# Patient Record
Sex: MALE | Race: WHITE | NOT HISPANIC OR LATINO | Employment: OTHER | URBAN - METROPOLITAN AREA
[De-identification: names, ages, dates, MRNs, and addresses within clinical notes are randomized per-mention and may not be internally consistent; named-entity substitution may affect disease eponyms.]

---

## 2017-12-20 ENCOUNTER — ALLSCRIPTS OFFICE VISIT (OUTPATIENT)
Dept: OTHER | Facility: OTHER | Age: 82
End: 2017-12-20

## 2017-12-21 NOTE — CONSULTS
Assessment   1  Diabetes mellitus (250 00) (E11 9)    Plan   Diabetes mellitus    · From  Tresiba FlexTouch 100 UNIT/ML Subcutaneous Solution Pen-injector 17    units in the am    8 units in the pm To Tresiba FlexTouch 100 UNIT/ML Subcutaneous Solution    Pen-injector 20 units in the evening   Rx By: Siena Seay; Dispense: 0 Days ; #:1 X 3 ML Pen (5 Pens); Refill: 5;For: Diabetes mellitus; STEVENSON = N; Record   · *1 - [facility] SELF MANAGEMENT TRAINING OUTPATIENT Co-Management  ehsan    cgm  Status: Hold For - Scheduling  Requested for: [id number]   Ordered; For: Diabetes mellitus; Ordered By: Siena Seay Performed:  Due: 09QUI0450  requires instruction : No  Needs requiring Individual DSMT? : No  Self-Management Education/Trainng : Individual Education  Care Summary provided  : Yes   · Follow-up visit in 1 month Evaluation and Treatment  Follow-up  Status: Complete  Done:    [id number]   Ordered; For: Diabetes mellitus; Ordered By: Siena Seay Performed:  Due: 04NHD7004; Last Updated By: Royal Hollis; 12/20/2017 12:16:41 PM    Discussion/Summary   Discussion Summary:    Type 2 diabetes with multiple complications uncontrolled based on history of hypoglycemia: The patient's most recent A1c of 6 0 in the setting of hypoglycemia unawareness, history of macrovascular disease and other comorbidities is below goal  I would be more comfortable with his A1c between 7 and 8 to minimize hypoglycemia and its negative affects  Reviewing the patient's blood glucose logs reveals blood sugars frequently at goal, but occasionally has hypoglycemia or sugars close to hypoglycemia before breakfast  I have suggested we switch Tresiba to once daily 20 units at bedtime  He will continue his current NovoLog scale of 2 units for every 50/150  In the setting of hypoglycemia unawareness, CKD, insulin use, I suggested we discontinue glimepiride  He will provide logs in about 2 weeks for review   We discussed use of continuous glucose monitoring devices  He is interested in using a diagnostic ehsan sensor right now for more information in terms of insulin monitoring and dose adjustments  In the future, he may be interested in personal use DEXCOM or freestyle ehsan, but we will Re approach this idea at future visits  I feel he would benefit from personal CGM in setting of hypoglycemia unawareness  Follow-up in 1 month  He will follow-up with his other providers as scheduled  Counseling Documentation With Imm: The patient, patient's family was counseled regarding diagnostic results,-- instructions for management,-- impressions,-- importance of compliance with treatment  Medication SE Review and Pt Understands Tx: Possible side effects of new medications were reviewed with the patient/guardian today  The treatment plan was reviewed with the patient/guardian  The patient/guardian understands and agrees with the treatment plan      Chief Complaint   Chief Complaint Free Text Note Form: consult      History of Present Illness   HPI: 75-year-old male with history of type 2 diabetes for over 10 years complicated by 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 3 days a week, CAD status post stent, hypoglycemia unawareness presents to establish care for his diabetes  He reports initially was treated with p o  medications and about for 5 years ago was transitioned to insulin  Currently he takes Jacob Bowl 17 units in the morning and 8 units at bedtime  He takes NovoLog on a sliding scale of 2 units for every 50 above 150  He also takes glimepiride 4 mg twice a day  He does report history of hypoglycemia particularly in the morning causing him to decrease his evening Tresiba dose on his own down to 8 units  He follows closely with an eye dr, podiatrist, nephrologist, cardiologist, and dietitian  Overall he is feeling well and denies specific concerns today        Review of Systems   Endo Adult ROS Male New Patient:      Constitutional/General: change in ring size,-- no change in shoe size,-- no chills,-- no dizziness,-- no fainting,-- no fatigue,-- no fever,-- no forgetfulness,-- no headache,-- loss of sleep,-- weight loss,-- no nervousness,-- no numbness,-- temperature intolerance,-- no excessive sweating-- and-- no weight gain  Muscle/Joint/Bone: no arm pain,-- no back pain,-- no hip pain,-- no leg pain,-- no foot pain,-- no neck pain,-- no hand pain,-- no shoulder pain,-- no arm weakness,-- no back weakness,-- no hip weakness,-- no leg weakness,-- no foot weakness,-- no neck weakness,-- no hand weakness,-- no shoulder weakness,-- no arm numbness,-- no back numbness,-- no hip numbness,-- no leg numbness,-- no foot numbness,-- no neck numbness,-- no hand numbness-- and-- no shoulder numbness  Gastrointestinal: constipation-- and-- diarrhea, but-- no excessive hunger,-- no excessive thirst,-- no nausea,-- no poor appetite,-- no rectal bleeding,-- no stomach pain,-- no vomiting-- and-- no vomiting blood  Cardiovascular: hypertension-- and-- poor circulation, but-- no chest pain,-- no irregular heart beat,-- no hypotension,-- no rapid heart beat-- and-- no ankle swelling  Eye/Ear/Nose/Throat: persistent cough-- and-- sinus problems, but-- no bleeding gums,-- no blurred vision,-- no difficulty swallowing,-- no double vision,-- no gritty eyes,-- no hoarseness,-- no hearing loss,-- not seeing flashes-- and-- not seeing halos  Skin: bruising easily-- and-- itching, but-- no hives,-- no change in a mole,-- no rashes,-- no scar-- and-- no slow healing sores  Genitourinary no blood in urine,-- no frequent urination,-- no night time urination-- and-- no painful urination  Genitourinary - Reproductive breast lump, but-- no erection difficulties  ROS Reviewed:    ROS reviewed  Past Medical History   Active Problems And Past Medical History Reviewed: The active problems and past medical history were reviewed and updated today        Surgical History   Surgical History Reviewed: The surgical history was reviewed and updated today  Family History   Family History Reviewed: The family history was reviewed and updated today  Social History   Social History Reviewed: The social history was reviewed and updated today  Current Meds    1  Adult Aspirin Low Strength 81 MG TBDP; Therapy: (Recorded:02Qlo6283) to Recorded   2  Albuterol Sulfate (2 5 MG/3ML) 0 083% Inhalation Nebulization Solution; Therapy: (Recorded:89Ruc5692) to Recorded   3  Atorvastatin Calcium 40 MG Oral Tablet; Therapy: (Recorded:71Icm1367) to Recorded   4  Calcium Acetate 667 MG TABS; Therapy: (Recorded:03Suy7774) to Recorded   5  CarBAMazepine 200 MG Oral Tablet; Therapy: (Recorded:16Vad9459) to Recorded   6  Carvedilol 12 5 MG Oral Tablet; Therapy: (Recorded:14Ixz4729) to Recorded   7  Clopidogrel Bisulfate 75 MG Oral Tablet; Therapy: (Recorded:60Siz8412) to Recorded   8  Delsym 30 MG/5ML LQCR; Therapy: (Recorded:57Owt0766) to Recorded   9  Dulera 100-5 MCG/ACT Inhalation Aerosol; Therapy: (Recorded:51Lqi6458) to Recorded   10  Flonase Allergy Relief 50 MCG/ACT Nasal Suspension; Therapy: (Recorded:49Pmx9232) to Recorded   11  Folic Acid 1 MG Oral Tablet; Therapy: (Recorded:83Hoo5488) to Recorded   12  Isosorbide Dinitrate 30 MG Oral Tablet; Therapy: (Recorded:30Fjj4384) to Recorded   13  Levothyroxine Sodium 75 MCG Oral Tablet; take half a tablet daily; Therapy: (Recorded:00Fbc2080) to Recorded   14  Lidocaine 5 % External Ointment; Therapy: (Recorded:51Fkc2117) to Recorded   15  Lomotil 2 5-0 025 MG Oral Tablet; Therapy: (Recorded:21Xdo9881) to Recorded   16  Lumigan SOLN;      Therapy: (Recorded:65Kbm4346) to Recorded   17  Melatonin 10 MG Oral Tablet; Therapy: (Recorded:82Zlr8182) to Recorded   18  Montelukast Sodium 10 MG Oral Tablet; Therapy: (Recorded:71Dxk3032) to Recorded   19   Jadine Grew FlexPen 100 UNIT/ML Subcutaneous Solution Pen-injector; Per sliding scale      150-199 2 units, 200-249 3 units, 250-300 4 units, with meals; Therapy: (Recorded:26Hoh8143) to Recorded   20  ProAir HFA AERS; Therapy: (Recorded:62Fep5013) to Recorded   21  Sertraline HCl - 25 MG Oral Tablet; Therapy: (Recorded:77Aii7709) to Recorded   22  Ramin Lutzs FlexTouch 100 UNIT/ML Subcutaneous Solution Pen-injector; 17 units in the am      8 units in the pm;      Therapy: (Recorded:03Xbm6164) to Recorded  Medication List Reviewed: The medication list was reviewed and updated today  Allergies   1  Iodinated Contrast Media   2  Lisinopril TABS    Vitals   Vital Signs    Recorded: 20Dec2017 11:09AM   Heart Rate 68   Systolic 322   Diastolic 58   Height 5 ft 8 5 in   Weight 184 lb 6 oz   BMI Calculated 27 63   BSA Calculated 1 98     Physical Exam        Constitutional      General appearance: No acute distress, well appearing and well nourished  Eyes      Conjunctiva and lids: No swelling, erythema, or discharge  Pupils: Equal, round and reactive to light  The sclera are anicteric  Extraocular movements are intact  Ears, Nose, Mouth, and Throat      Neck: The neck is supple  The thyroid is normal in size with no palpable nodules  Pulmonary      Respiratory effort: No increased work of breathing or signs of respiratory distress  Auscultation of lungs: Clear to auscultation bilaterally with normal chest expansion  Cardiovascular      Auscultation of heart: Normal rate and rhythm with no murmurs, gallops or rubs  Examination of extremities for edema and/or varicosities: Normal        Abdomen      Abdomen: Abdomen is soft, non-tender with normal bowel sounds  Lymphatic      Palpation of lymph nodes: No supraclavicular or suboccipital lymphadenopathy  Skin      Skin and subcutaneous tissue: Normal skin temperature and color         Psychiatric      Orientation to person, place and time: Normal        Mood and affect: Affect and attention span are normal        Results/Data   Office Record Review: I have reviewed laboratory results as follows: 10/20/17: A1C 6 0        Signatures    Electronically signed by : RHETT Bynum ; Dec 20 2017 12:18PM EST                       (Author)

## 2018-01-08 ENCOUNTER — ALLSCRIPTS OFFICE VISIT (OUTPATIENT)
Dept: OTHER | Facility: OTHER | Age: 83
End: 2018-01-08

## 2018-01-22 VITALS
BODY MASS INDEX: 27.31 KG/M2 | DIASTOLIC BLOOD PRESSURE: 58 MMHG | HEART RATE: 68 BPM | SYSTOLIC BLOOD PRESSURE: 162 MMHG | HEIGHT: 69 IN | WEIGHT: 184.38 LBS

## 2018-01-23 NOTE — PROGRESS NOTES
Chief Complaint  Patient presented for application of CGM-Jose D  Patient acknowledged and signed treatment waiver  CGM was applied to the patient's upper right arm  Active Problems    1  Diabetes mellitus (250 00) (E11 9)    Current Meds   1  Adult Aspirin Low Strength 81 MG TBDP; Therapy: (Recorded:14Ttq2670) to Recorded   2  Albuterol Sulfate (2 5 MG/3ML) 0 083% Inhalation Nebulization Solution; Therapy: (Recorded:54Mzr5915) to Recorded   3  Amiodarone HCl - 200 MG Oral Tablet; Therapy: (Queta Mitchell) to Recorded   4  Atorvastatin Calcium 40 MG Oral Tablet; Therapy: (Recorded:97Upv8589) to Recorded   5  Calcium Acetate 667 MG TABS; Therapy: (Recorded:96Luo3264) to Recorded   6  CarBAMazepine 200 MG Oral Tablet; TAKE 1 TABLET TWICE DAILY; Therapy: (Recorded:96Eup2804) to Recorded   7  Carvedilol 12 5 MG Oral Tablet; TAKE 1 TABLET TWICE DAILY; Therapy: (Recorded:73Nwl6290) to Recorded   8  Clopidogrel Bisulfate 75 MG Oral Tablet; Therapy: (Recorded:89Qzi9336) to Recorded   9  Delsym 30 MG/5ML LQCR; Therapy: (Recorded:36Cxp6414) to Recorded   10  Dulera 100-5 MCG/ACT Inhalation Aerosol; Therapy: (Recorded:89Xtm3950) to Recorded   11  Flonase Allergy Relief 50 MCG/ACT Nasal Suspension; Therapy: (Recorded:83Pzk1327) to Recorded   12  Folic Acid 1 MG Oral Tablet; Therapy: (Recorded:34Drh9251) to Recorded   13  Isosorbide Dinitrate 30 MG Oral Tablet; Therapy: (Recorded:43Hxk9743) to Recorded   14  Levothyroxine Sodium 75 MCG Oral Tablet; take half a tablet daily; Therapy: (Recorded:12Xfz6041) to Recorded   15  Lidocaine 5 % External Ointment; Therapy: (Recorded:83Wie8853) to Recorded   16  Lomotil 2 5-0 025 MG Oral Tablet; Therapy: (Recorded:13Qtp8060) to Recorded   17  Lumigan SOLN;    Therapy: (Recorded:35Iag7226) to Recorded   18  Melatonin 10 MG Oral Tablet; Therapy: (Recorded:10Azr2744) to Recorded   19  Montelukast Sodium 10 MG Oral Tablet;     Therapy: (Recorded:47Hvp6128) to Recorded   20  NovoLOG FlexPen 100 UNIT/ML Subcutaneous Solution Pen-injector; Per sliding scale    150-199 2 units, 200-249 3 units, 250-300 4 units, with meals; Therapy: (Recorded:30Rjw4650) to Recorded   21  ProAir HFA AERS; Therapy: (Recorded:01Ise7099) to Recorded   22  Sertraline HCl - 25 MG Oral Tablet; Therapy: (Recorded:48Jvf2870) to Recorded   23  Bri Peer FlexTouch 100 UNIT/ML Subcutaneous Solution Pen-injector; 20 units in the    evening; Last Rx:72Jhl2421 Ordered    Allergies    1  Iodinated Contrast Media   2  Lisinopril TABS    Future Appointments    Date/Time Provider Specialty Site   01/24/2018 12:50 PM RHETT Bhandari   Endocrinology [facility] ENDOCRINOLOGY  [facility]   Electronically signed by : Zoran Carlisle, ; Jan 8 2018  2:36PM EST                       (Author)

## 2018-02-02 ENCOUNTER — TELEPHONE (OUTPATIENT)
Dept: ENDOCRINOLOGY | Facility: HOSPITAL | Age: 83
End: 2018-02-02

## 2018-02-02 NOTE — TELEPHONE ENCOUNTER
Patient's daughter called and spoke to Ladi up at [facility]  He cannot come in today to get the [place] removed  Ladi let them know that if the hospital removes the [place] or if it falls off to return it to the office   If it's still on when he is dc'd they will reschedule for removal

## 2018-02-08 DIAGNOSIS — Z79.4 TYPE 2 DIABETES MELLITUS WITH COMPLICATION, WITH LONG-TERM CURRENT USE OF INSULIN (HCC): Primary | ICD-10-CM

## 2018-02-08 DIAGNOSIS — E11.8 TYPE 2 DIABETES MELLITUS WITH COMPLICATION, WITH LONG-TERM CURRENT USE OF INSULIN (HCC): Primary | ICD-10-CM

## 2018-03-05 ENCOUNTER — TELEPHONE (OUTPATIENT)
Dept: OTHER | Facility: HOSPITAL | Age: 83
End: 2018-03-05

## 2018-03-06 NOTE — TELEPHONE ENCOUNTER
Received page from patient that he took 25 units of fast acting insulin at bedtime instead of Tresiba  His sugar was over 400 too  I suggested he not take any additional insulin tonight and monitor his sugar again after 1 and 2 hours from now  Suggested he have a snack or juice as well and also have additional 15-30 g of carb if his sugar is under 150 in about 2-3 hours before he goes to bed

## 2018-03-22 RX ORDER — SERTRALINE HYDROCHLORIDE 25 MG/1
TABLET, FILM COATED ORAL
COMMUNITY

## 2018-03-22 RX ORDER — FOLIC ACID 1 MG/1
TABLET ORAL
COMMUNITY

## 2018-03-22 RX ORDER — PHENOL 1.4 %
AEROSOL, SPRAY (ML) MUCOUS MEMBRANE
COMMUNITY

## 2018-03-22 RX ORDER — ALBUTEROL SULFATE 90 UG/1
AEROSOL, METERED RESPIRATORY (INHALATION)
COMMUNITY

## 2018-03-22 RX ORDER — LIDOCAINE 50 MG/G
OINTMENT TOPICAL
COMMUNITY

## 2018-03-22 RX ORDER — ALBUTEROL SULFATE 2.5 MG/3ML
SOLUTION RESPIRATORY (INHALATION)
COMMUNITY

## 2018-03-22 RX ORDER — CLOPIDOGREL BISULFATE 75 MG/1
TABLET ORAL
COMMUNITY

## 2018-03-22 RX ORDER — DIPHENOXYLATE HYDROCHLORIDE AND ATROPINE SULFATE 2.5; .025 MG/1; MG/1
TABLET ORAL
COMMUNITY

## 2018-03-22 RX ORDER — ISOSORBIDE DINITRATE 30 MG/1
TABLET ORAL
COMMUNITY

## 2018-03-22 RX ORDER — DEXTROMETHORPHAN POLISTIREX 30 MG/5ML
SUSPENSION ORAL
COMMUNITY

## 2018-03-22 RX ORDER — FLUTICASONE PROPIONATE 50 MCG
SPRAY, SUSPENSION (ML) NASAL
COMMUNITY

## 2018-03-22 RX ORDER — LEVOTHYROXINE SODIUM 0.07 MG/1
TABLET ORAL
COMMUNITY
End: 2018-05-18 | Stop reason: SDUPTHER

## 2018-03-22 RX ORDER — MONTELUKAST SODIUM 10 MG/1
TABLET ORAL
COMMUNITY

## 2018-03-22 RX ORDER — CARVEDILOL 12.5 MG/1
1 TABLET ORAL 2 TIMES DAILY
COMMUNITY

## 2018-03-22 RX ORDER — ATORVASTATIN CALCIUM 40 MG/1
TABLET, FILM COATED ORAL
COMMUNITY

## 2018-03-22 RX ORDER — CARBAMAZEPINE 200 MG/1
1 TABLET ORAL 2 TIMES DAILY
COMMUNITY

## 2018-03-22 RX ORDER — AMIODARONE HYDROCHLORIDE 200 MG/1
TABLET ORAL
COMMUNITY

## 2018-03-23 ENCOUNTER — OFFICE VISIT (OUTPATIENT)
Dept: ENDOCRINOLOGY | Facility: HOSPITAL | Age: 83
End: 2018-03-23
Payer: MEDICARE

## 2018-03-23 VITALS
SYSTOLIC BLOOD PRESSURE: 120 MMHG | WEIGHT: 184 LBS | DIASTOLIC BLOOD PRESSURE: 64 MMHG | HEIGHT: 68 IN | BODY MASS INDEX: 27.89 KG/M2 | HEART RATE: 60 BPM

## 2018-03-23 DIAGNOSIS — E78.49 OTHER HYPERLIPIDEMIA: ICD-10-CM

## 2018-03-23 DIAGNOSIS — I10 ESSENTIAL HYPERTENSION: ICD-10-CM

## 2018-03-23 DIAGNOSIS — E04.2 MULTIPLE THYROID NODULES: ICD-10-CM

## 2018-03-23 DIAGNOSIS — E03.9 ACQUIRED HYPOTHYROIDISM: ICD-10-CM

## 2018-03-23 DIAGNOSIS — Z79.4 TYPE 2 DIABETES MELLITUS WITH HYPERGLYCEMIA, WITH LONG-TERM CURRENT USE OF INSULIN (HCC): Primary | ICD-10-CM

## 2018-03-23 DIAGNOSIS — E11.65 TYPE 2 DIABETES MELLITUS WITH HYPERGLYCEMIA, WITH LONG-TERM CURRENT USE OF INSULIN (HCC): Primary | ICD-10-CM

## 2018-03-23 PROBLEM — E11.9 DIABETES MELLITUS (HCC): Status: ACTIVE | Noted: 2017-12-20

## 2018-03-23 PROCEDURE — 99214 OFFICE O/P EST MOD 30 MIN: CPT | Performed by: NURSE PRACTITIONER

## 2018-03-23 PROCEDURE — 95251 CONT GLUC MNTR ANALYSIS I&R: CPT | Performed by: NURSE PRACTITIONER

## 2018-03-23 RX ORDER — POLYMYXIN B SULFATE AND TRIMETHOPRIM 1; 10000 MG/ML; [USP'U]/ML
SOLUTION OPHTHALMIC
COMMUNITY
Start: 2018-02-13

## 2018-03-23 RX ORDER — PREDNISOLONE ACETATE 10 MG/ML
SUSPENSION/ DROPS OPHTHALMIC
COMMUNITY
Start: 2018-02-13

## 2018-03-23 RX ORDER — ISOSORBIDE MONONITRATE 30 MG/1
TABLET, EXTENDED RELEASE ORAL
COMMUNITY
Start: 2018-03-01

## 2018-03-23 RX ORDER — KETOROLAC TROMETHAMINE 5 MG/ML
SOLUTION OPHTHALMIC
COMMUNITY
Start: 2018-02-13

## 2018-03-23 RX ORDER — PEN NEEDLE, DIABETIC 32GX 5/32"
NEEDLE, DISPOSABLE MISCELLANEOUS
COMMUNITY
Start: 2018-03-16

## 2018-03-23 NOTE — PROGRESS NOTES
Follow Up - Rodger Serna 80 y o  male MRN: [id number]   @ Encounter: [id number]      Assessment/Plan     Assessment: This is a 80y o -year-old male with type 2 diabetes, hypothyroidism, thyroid nodules, hypertension and hyperlipidemia  Plan:  1  Type 2 diabetes:  He presents with blood sugar logs taken 3-4 times daily and appears to be having hyperglycemia later in the day  The report from his [address] diagnostic trial from January 2018 also illustrates this trend  I have asked him to increase his dose of NovoLog at lunch to 8 units and dinner to 10 units  His breakfast dose of NovoLog will remain 5 units and his Tresiba dose will remain the same at 25 units at bedtime  I have asked him to continue checking his blood sugars 4 times daily and for the record to the office in 2 weeks for review and further adjustment, if necessary  Check hemoglobin A1c     2   Hypothyroidism: For now, continue levothyroxine at current dose  Check TSH and free T4  He denies any symptoms of hypo or hyperthyroidism  3   Hypertension:  He is normotensive in the office today  Continue current regimen  Check comprehensive metabolic panel  4   Hyperlipidemia:  Continue atorvastatin  Check fasting lipid panel  5   Thyroid nodules:  He states he has a history of thyroid nodules  It appears that his last thyroid ultrasound was approximately 2 years ago  I have ordered another thyroid ultrasound  He denies any neck pain/discomfort, dysphagia or dysphonia  CC: Diabetes Follow Up    History of Present Illness     HPI: 80 y o  male presents in the office today for follow up of Type 2 Diabetes  he   states he has been diagnosed with Type 2 Diabetes for 5-6 years and is checking blood glucose readings 3-4 times daily  He was utilizing a [employer] CGM device to gather more data to gain insight into his glycemic control throughout the day  However, it fell off approximately 1 week ago and had run through the laundry    He states he has had low blood sugars but has no symptoms and relative unawareness  He has ESRD and has renal dialysis on Tuesday, Thursday and Saturday  Most recent Hemoglobin A1c was 6 0  His most recent current diabetic medication regimen is as follows:  Tresiba 20 units at bedtime  NovoLog 5 units at meals  he states he is up to date with his annual diabetic eye exam, he admits to cataracts with recent surgery for correction and denies retinopathy  he has no complaints about his feet and does follow Dr Edelmira Childress for podiatry and regular diabetic foot care  For his hypothyroidism, he is taking levothyroxine 37 mcg daily  He denies any symptoms of hypo or hyperthyroidism  He also states he has a PMH of thyroid nodules  For his hypertension, he is treated with carvedilol 12 5 mg twice daily and isosorbide 30 mg daily  His hyperlipidemia is treated with a atorvastatin 40 mg daily  Consults    Review of Systems   Constitutional: Positive for fatigue (At times)  Negative for chills and fever  HENT: Negative  Respiratory: Positive for shortness of breath ( orthopnea at times)  Negative for cough  Cardiovascular: Negative for chest pain and palpitations  Gastrointestinal: Negative for abdominal pain, constipation, diarrhea, nausea and vomiting  Endocrine: Positive for cold intolerance ( at times)  Negative for heat intolerance, polydipsia, polyphagia and polyuria  Genitourinary: Negative  Musculoskeletal: Positive for arthralgias, gait problem ( utilizes cane) and myalgias  Skin: Negative  Allergic/Immunologic: Negative  Neurological: Negative for syncope, light-headedness and headaches  Hematological: Negative  Psychiatric/Behavioral: Negative  All other systems reviewed and are negative  Historical Information   No past medical history on file  No past surgical history on file    Social History   History   Alcohol use Not on file     History   Drug use: Unknown History   Smoking Status    Not on file   Smokeless Tobacco    Not on file     Family History: No family history on file  Meds/Allergies   Current Outpatient Prescriptions   Medication Sig Dispense Refill    albuterol (2 5 mg/3 mL) 0 083 % nebulizer solution Inhale      albuterol (PROAIR HFA) 90 mcg/act inhaler Inhale      amiodarone 200 mg tablet Take by mouth      Aspirin 81 MG EC tablet Take by mouth      atorvastatin (LIPITOR) 40 mg tablet Take by mouth      bimatoprost (LUMIGAN) 0 01 % ophthalmic drops Apply to eye      Calcium Acetate 668 (169 Ca) MG TABS Take by mouth      carBAMazepine (TEGretol) 200 mg tablet Take 1 tablet by mouth 2 (two) times a day      carvedilol (COREG) 12 5 mg tablet Take 1 tablet by mouth 2 (two) times a day      clopidogrel (PLAVIX) 75 mg tablet Take by mouth      Dextromethorphan Polistirex ER (DELSYM) 30 MG/5ML SUER Take by mouth      diphenoxylate-atropine (LOMOTIL) 2 5-0 025 mg per tablet Take by mouth      fluticasone (FLONASE ALLERGY RELIEF) 50 mcg/act nasal spray into each nostril      folic acid (FOLVITE) 1 mg tablet Take by mouth      insulin aspart (NovoLOG) 100 Units/mL SOPN 5 units tid ac plus 2:50>150 5 pen 0    insulin degludec (TRESIBA) injection pen 100 units/mL 25 units qhs 5 pen 0    isosorbide dinitrate (ISORDIL) 30 mg tablet Take by mouth      levothyroxine 75 mcg tablet Take by mouth      lidocaine (XYLOCAINE) 5 % ointment Apply topically      Melatonin 10 MG TABS Take by mouth      Mometasone Furo-Formoterol Fum (DULERA) 100-5 MCG/ACT AERO Inhale      montelukast (SINGULAIR) 10 mg tablet Take by mouth      sertraline (ZOLOFT) 25 mg tablet Take by mouth       No current facility-administered medications for this visit  Allergies   Allergen Reactions    Contrast  [Iodinated Diagnostic Agents]      Other reaction(s): kidney function    Lisinopril Cough       Objective   Vitals:  There were no vitals taken for this visit     Physical Exam   Constitutional: He is oriented to person, place, and time  He appears well-developed and well-nourished  No distress  HENT:   Head: Normocephalic and atraumatic  Nose: Nose normal    Mouth/Throat: Oropharynx is clear and moist    Eyes: Conjunctivae and EOM are normal  Pupils are equal, round, and reactive to light  Right eye exhibits no discharge  Left eye exhibits no discharge  Neck: Normal range of motion  Neck supple  No tracheal deviation present  No thyromegaly present  Cardiovascular: Normal rate, regular rhythm, normal heart sounds and intact distal pulses  Pulmonary/Chest: Effort normal and breath sounds normal  No respiratory distress  He has no wheezes  He exhibits no tenderness  Decreased lung sounds in bases bilaterally   Abdominal: Soft  Bowel sounds are normal  He exhibits no distension  There is no tenderness  Musculoskeletal: Normal range of motion  He exhibits edema (+1 pedal edema)  He exhibits no tenderness or deformity  Bilateral leg discomfort  Utilizes cane  Lymphadenopathy:     He has no cervical adenopathy  Neurological: He is alert and oriented to person, place, and time  No cranial nerve deficit  Coordination normal    Skin: Skin is warm and dry  No rash noted  No erythema  No pallor  Dry skin   Psychiatric: He has a normal mood and affect  His behavior is normal  Judgment and thought content normal    Vitals reviewed  Portions of the record may have been created with voice recognition software

## 2018-03-23 NOTE — PATIENT INSTRUCTIONS
Novolog:  Keep breakfast dose at 5 units for now  Increase lunch time dose to 8 units  Increase dinner time dose to 10 units  Kun Bauer:  Keep bedtime dose at 25 units, for now  Be mindful of diet  Continue Levothyroxine at current dose  Obtain a thyroid ultrasound when able  Continue to utilize CPAP nightly

## 2018-03-26 NOTE — PROGRESS NOTES
Continous glucose monitoring ehsan intrepretation     Date/Time 3/26/2018 8:38 AM     Performed by  ELEANOR Sarmiento     Authorized by Samantha Alvarez       Consent: Verbal consent obtained    Risks and benefits: risks, benefits and alternatives were discussed  Consent given by: patient  Patient understanding: patient states understanding of the procedure being performed  Patient consent: the patient's understanding of the procedure matches consent given  Procedure consent: procedure consent matches procedure scheduled  Relevant documents: relevant documents present and verified  Test results: test results available and properly labeled  Required items: required blood products, implants, devices, and special equipment available  Patient identity confirmed: verbally with patient      Local anesthesia used: no     Anesthesia   Local anesthesia used: no     Sedation   Patient sedated: no      Specimen: no    Culture: no   Procedure Details   Patient tolerance: Patient tolerated the procedure well with no immediate complications

## 2018-04-09 ENCOUNTER — TELEPHONE (OUTPATIENT)
Dept: ENDOCRINOLOGY | Facility: HOSPITAL | Age: 83
End: 2018-04-09

## 2018-04-09 LAB — HBA1C MFR BLD HPLC: 7.7 %

## 2018-04-09 NOTE — TELEPHONE ENCOUNTER
Thanks  He is on dialysis so this is probably around his baseline  Will review labs when I get them

## 2018-04-09 NOTE — TELEPHONE ENCOUNTER
Lab at [facility] called with critical creatinine of 4 73 on 4/9/18  They will be faxing over the report as well

## 2018-04-11 DIAGNOSIS — E03.9 HYPOTHYROIDISM, UNSPECIFIED TYPE: Primary | ICD-10-CM

## 2018-04-13 DIAGNOSIS — E11.8 TYPE 2 DIABETES MELLITUS WITH COMPLICATION, UNSPECIFIED WHETHER LONG TERM INSULIN USE: Primary | ICD-10-CM

## 2018-04-13 RX ORDER — LANCETS 30 GAUGE
EACH MISCELLANEOUS 4 TIMES DAILY
Qty: 400 EACH | Refills: 3 | Status: SHIPPED | OUTPATIENT
Start: 2018-04-13

## 2018-04-24 DIAGNOSIS — E11.8 TYPE 2 DIABETES MELLITUS WITH COMPLICATION, WITH LONG-TERM CURRENT USE OF INSULIN (HCC): Primary | ICD-10-CM

## 2018-04-24 DIAGNOSIS — Z79.4 TYPE 2 DIABETES MELLITUS WITH COMPLICATION, WITH LONG-TERM CURRENT USE OF INSULIN (HCC): Primary | ICD-10-CM

## 2018-04-24 NOTE — TELEPHONE ENCOUNTER
Pt is requesting 90 day refills of his Cyntha Hash, Novolog and Syringes for the Novolog to [facility] Please

## 2018-04-26 DIAGNOSIS — E11.8 TYPE 2 DIABETES MELLITUS WITH COMPLICATION, WITH LONG-TERM CURRENT USE OF INSULIN (HCC): ICD-10-CM

## 2018-04-26 DIAGNOSIS — Z79.4 TYPE 2 DIABETES MELLITUS WITH COMPLICATION, WITH LONG-TERM CURRENT USE OF INSULIN (HCC): ICD-10-CM

## 2018-05-14 DIAGNOSIS — Z79.4 TYPE 2 DIABETES MELLITUS WITHOUT COMPLICATION, WITH LONG-TERM CURRENT USE OF INSULIN (HCC): Primary | ICD-10-CM

## 2018-05-14 DIAGNOSIS — E11.9 TYPE 2 DIABETES MELLITUS WITHOUT COMPLICATION, WITH LONG-TERM CURRENT USE OF INSULIN (HCC): Primary | ICD-10-CM

## 2018-05-18 DIAGNOSIS — E03.9 HYPOTHYROIDISM, UNSPECIFIED TYPE: Primary | ICD-10-CM

## 2018-05-18 RX ORDER — LEVOTHYROXINE SODIUM 0.07 MG/1
TABLET ORAL
Qty: 50 TABLET | Refills: 2 | Status: SHIPPED | OUTPATIENT
Start: 2018-05-18 | End: 2018-08-06 | Stop reason: DRUGHIGH

## 2018-06-22 DIAGNOSIS — E11.8 TYPE 2 DIABETES MELLITUS WITH COMPLICATION, WITH LONG-TERM CURRENT USE OF INSULIN (HCC): ICD-10-CM

## 2018-06-22 DIAGNOSIS — Z79.4 TYPE 2 DIABETES MELLITUS WITH COMPLICATION, WITH LONG-TERM CURRENT USE OF INSULIN (HCC): ICD-10-CM

## 2018-06-29 ENCOUNTER — TELEPHONE (OUTPATIENT)
Dept: ENDOCRINOLOGY | Facility: HOSPITAL | Age: 83
End: 2018-06-29

## 2018-06-29 NOTE — TELEPHONE ENCOUNTER
Spoke with patient's daughter in regards to recent labs results  While on the phone she mentioned she is concerned about Herb's blood sugars and states he has been having lows  She verbally gave me his blood sugars for the past week  Yelitza Gerardo has been taking his novolog before meals as directed  She states that the patient has been taking 30 units of Tresiba at night and has been adjusting this according to his bedtime sugars  She states the patient has been weak, confused and has no body strength  She states she has been falling recently at home  I spoke with Inga Figueroa and he advised me to have Yelitza Gerardo take 20 units of Tresiba at night   Per Gerhardt Ship I also advised Stephen Felicity and Yelitza Gerardo to not make adjustments to his Luci Reasons depending on his blood sugar  Gerhardt Ship did not advise any changes to the patient's Novolog regimen  The patient was advised to send in blood sugars next week    Can you please make the adjustment to the Tresiba in the patient's medication list?

## 2018-06-29 NOTE — PROGRESS NOTES
Please call the patient regarding abnormal result  Total T3 is low however free T4 is normal  Did he have a TSH drawn?

## 2018-07-03 DIAGNOSIS — Z79.4 TYPE 2 DIABETES MELLITUS WITH COMPLICATION, WITH LONG-TERM CURRENT USE OF INSULIN (HCC): ICD-10-CM

## 2018-07-03 DIAGNOSIS — E11.8 TYPE 2 DIABETES MELLITUS WITH COMPLICATION, WITH LONG-TERM CURRENT USE OF INSULIN (HCC): ICD-10-CM

## 2018-07-10 DIAGNOSIS — E03.9 HYPOTHYROIDISM, UNSPECIFIED TYPE: Primary | ICD-10-CM

## 2018-07-12 ENCOUNTER — TELEPHONE (OUTPATIENT)
Dept: ENDOCRINOLOGY | Facility: HOSPITAL | Age: 83
End: 2018-07-12

## 2018-07-12 NOTE — TELEPHONE ENCOUNTER
Patient is in [facility] ER  Daughter called to let us know that his sugars have been consistent at 160-180s since he has been taking the 20 units of tresiba at night

## 2018-07-24 ENCOUNTER — OFFICE VISIT (OUTPATIENT)
Dept: ENDOCRINOLOGY | Facility: HOSPITAL | Age: 83
End: 2018-07-24
Payer: MEDICARE

## 2018-07-24 VITALS
HEIGHT: 68 IN | HEART RATE: 72 BPM | SYSTOLIC BLOOD PRESSURE: 134 MMHG | BODY MASS INDEX: 29.86 KG/M2 | WEIGHT: 197 LBS | DIASTOLIC BLOOD PRESSURE: 70 MMHG

## 2018-07-24 DIAGNOSIS — Z79.4 TYPE 2 DIABETES MELLITUS WITH COMPLICATION, WITH LONG-TERM CURRENT USE OF INSULIN (HCC): ICD-10-CM

## 2018-07-24 DIAGNOSIS — E03.9 ACQUIRED HYPOTHYROIDISM: Primary | ICD-10-CM

## 2018-07-24 DIAGNOSIS — Z79.4 TYPE 2 DIABETES MELLITUS WITH HYPERGLYCEMIA, WITH LONG-TERM CURRENT USE OF INSULIN (HCC): ICD-10-CM

## 2018-07-24 DIAGNOSIS — I10 ESSENTIAL HYPERTENSION: ICD-10-CM

## 2018-07-24 DIAGNOSIS — E78.49 OTHER HYPERLIPIDEMIA: ICD-10-CM

## 2018-07-24 DIAGNOSIS — E04.2 MULTIPLE THYROID NODULES: ICD-10-CM

## 2018-07-24 DIAGNOSIS — E11.8 TYPE 2 DIABETES MELLITUS WITH COMPLICATION, WITH LONG-TERM CURRENT USE OF INSULIN (HCC): ICD-10-CM

## 2018-07-24 DIAGNOSIS — E11.65 TYPE 2 DIABETES MELLITUS WITH HYPERGLYCEMIA, WITH LONG-TERM CURRENT USE OF INSULIN (HCC): ICD-10-CM

## 2018-07-24 PROCEDURE — 99214 OFFICE O/P EST MOD 30 MIN: CPT | Performed by: NURSE PRACTITIONER

## 2018-07-24 NOTE — PROGRESS NOTES
Cruz Filter 80 y o  male MRN: [id number]    Encounter: [id number]      Assessment/Plan     Assessment: This is a 80y o -year-old male withtype 2 diabetes, hypothyroidism, thyroid nodules, hypertension and hyperlipidemia  Plan:  1  Type 2 diabetes:  He presents with blood sugar logs taken 3-4 times daily and appears to be having some higher blood sugars throughout the day but is improved  The report from his [address] diagnostic trial from January 2018 also illustrates this trend  I have asked him to increase his dose of NovoLog at breakfast to 8 units, lunch to 10 units and dinner to 11 units  His Tresiba dose will remain the same at 20 units at bedtime  I have asked him to continue checking his blood sugars 4 times daily and for the record to the office in 2 weeks for review and further adjustment, if necessary  Check hemoglobin A1c      2  Hypothyroidism: For now, continue levothyroxine at current dose  Check TSH and free T4  He denies any symptoms of hypo or hyperthyroidism      3  Hypertension:  He is normotensive in the office today  Continue current regimen  Check comprehensive metabolic panel      4  Hyperlipidemia:  Continue atorvastatin       5  Thyroid nodules:  His most recent thyroid ultrasound from June 20, 2018 reveals a stable right-sided dominant nodule and 2 nodules on the left thyroid lobe  Left-sided Nodule #2 is suspicious  I have referred him for ultrasound-guided biopsy of the thyroid nodule with affirma testing  He denies any neck pain/discomfort, dysphagia or dysphonia        CC:  Type 2 Diabetes follow-up    History of Present Illness     HPI:  80 y o  male presents in the office today for follow up of Type 2 Diabetes  he states he has been diagnosed with Type 2 Diabetes for 5-6 years and is checking blood glucose readings 3-4 times daily  He states he has had some infrequent instances of low blood sugars but has no symptoms and relative unawareness    He has ESRD and has renal dialysis on Tuesday, Thursday and Saturday  Most recent Hemoglobin A1c was 6 0  His most recent current diabetic medication regimen is as follows:  Tresiba 20 units at bedtime  NovoLog 6 units at breakfast, 8 units at lunch and 10 units at dinner  He states he is up to date with his annual diabetic eye exam, he admits to cataracts with recent surgery for correction and denies retinopathy  he has no complaints about his feet and does follow Dr Lizzeth Clemons for podiatry and regular diabetic foot care      For his hypothyroidism, he is taking levothyroxine 137 mcg daily  He denies any symptoms of hypo or hyperthyroidism  He also states he has a PMH of thyroid nodules  His most recent free T4 from June 18, 2017 was 1 05 with total T3 of 48 3 , His most recent thyroid  ultrasound from June 20, 2018 revealed a dominant right-sided nodule and 2 left-sided nodules  All the report he nodule 2  On the left side measured 13 x 11 x 13 and was recommended for biopsy      For his hypertension, he is treated with carvedilol 12 5 mg twice daily and isosorbide 30 mg daily      His hyperlipidemia is treated with a atorvastatin 40 mg daily  Review of Systems   Constitutional: Positive for fatigue  Negative for chills and fever  HENT: Negative  Eyes: Negative  Negative for photophobia, pain, discharge, redness, itching and visual disturbance  Respiratory: Positive for shortness of breath (Dyspnea On exertion)  Negative for cough  Cardiovascular: Negative for chest pain and palpitations  Gastrointestinal: Negative for abdominal pain, constipation, diarrhea, nausea and vomiting  Endocrine: Positive for cold intolerance  Negative for heat intolerance, polydipsia, polyphagia and polyuria  Genitourinary: Negative  Musculoskeletal: Positive for arthralgias ( generalized), gait problem ( utilizes walker) and myalgias ( generalized)  Skin: Negative  Allergic/Immunologic: Negative      Neurological: Negative for dizziness, syncope, light-headedness and headaches  Hematological: Negative  Psychiatric/Behavioral: Negative  All other systems reviewed and are negative  Historical Information   No past medical history on file  No past surgical history on file    Social History   History   Alcohol use Not on file     History   Drug use: Unknown     History   Smoking Status    Never Smoker   Smokeless Tobacco    Never Used     Family History:   Family History   Problem Relation Age of Onset    Rheum arthritis Mother     Alzheimer's disease Mother     Lung cancer Father        Meds/Allergies   Current Outpatient Prescriptions   Medication Sig Dispense Refill    albuterol (2 5 mg/3 mL) 0 083 % nebulizer solution Inhale      albuterol (PROAIR HFA) 90 mcg/act inhaler Inhale      amiodarone 200 mg tablet Take by mouth      Aspirin 81 MG EC tablet Take by mouth      atorvastatin (LIPITOR) 40 mg tablet Take by mouth      BD PEN NEEDLE KELLY U/F 32G X 4 MM MISC       Calcium Acetate 668 (169 Ca) MG TABS Take by mouth      carBAMazepine (TEGretol) 200 mg tablet Take 1 tablet by mouth 2 (two) times a day      carvedilol (COREG) 12 5 mg tablet Take 1 tablet by mouth 2 (two) times a day      clopidogrel (PLAVIX) 75 mg tablet Take by mouth      Dextromethorphan Polistirex ER (DELSYM) 30 MG/5ML SUER Take by mouth      diphenoxylate-atropine (LOMOTIL) 2 5-0 025 mg per tablet Take by mouth      EASY TOUCH LANCETS 30G MISC by Does not apply route 4 (four) times a day DX E11 9 400 each 3    fluticasone (FLONASE ALLERGY RELIEF) 50 mcg/act nasal spray into each nostril      folic acid (FOLVITE) 1 mg tablet Take by mouth      glucose blood (MADYSON CONTOUR NEXT TEST) test strip 1 each by Other route 4 (four) times a day (before meals and at bedtime) Use as instructed 400 each 1    insulin aspart (NovoLOG) 100 Units/mL injection pen Inject 6 units before breakfast, 8 units before lunch, and 10 units before dinner  15 pen 0    insulin aspart (NovoLOG) 100 Units/mL SOPN 5 units tid ac plus 2:50>150 5 pen 0    insulin degludec (TRESIBA) 100 units/mL injection pen 20 units qhs 15 pen 0    isosorbide dinitrate (ISORDIL) 30 mg tablet Take by mouth      isosorbide mononitrate (IMDUR) 30 mg 24 hr tablet       levothyroxine 75 mcg tablet Take 1/2 tablet Monday through Saturday  Take 1 whole tablet on Sunday  50 tablet 2    lidocaine (XYLOCAINE) 5 % ointment Apply topically      Melatonin 10 MG TABS Take by mouth      Mometasone Furo-Formoterol Fum (DULERA) 100-5 MCG/ACT AERO Inhale      montelukast (SINGULAIR) 10 mg tablet Take by mouth      mupirocin (BACTROBAN) 2 % ointment       sertraline (ZOLOFT) 25 mg tablet Take by mouth      sertraline (ZOLOFT) 50 mg tablet       bimatoprost (LUMIGAN) 0 01 % ophthalmic drops Apply to eye      ketorolac (ACULAR) 0 5 % ophthalmic solution       polymyxin b-trimethoprim (POLYTRIM) ophthalmic solution       prednisoLONE acetate (PRED FORTE) 1 % ophthalmic suspension        No current facility-administered medications for this visit  Allergies   Allergen Reactions    Contrast  [Iodinated Diagnostic Agents]      Other reaction(s): kidney function    Lisinopril Cough       Objective   Vitals: Blood pressure 134/70, pulse 72, height 5' 8" (1 727 m), weight 89 4 kg (197 lb)  Physical Exam   Constitutional: He is oriented to person, place, and time  He appears well-developed and well-nourished  HENT:   Head: Normocephalic and atraumatic  Nose: Nose normal    Mouth/Throat: Oropharynx is clear and moist    Eyes: Conjunctivae and EOM are normal  Pupils are equal, round, and reactive to light  Neck: Normal range of motion  Neck supple  Cardiovascular: Normal rate, regular rhythm, normal heart sounds and intact distal pulses  Pulmonary/Chest: Effort normal and breath sounds normal    Abdominal: Soft   Bowel sounds are normal    Musculoskeletal: Normal range of motion  Neurological: He is alert and oriented to person, place, and time  Skin: Skin is warm and dry  Psychiatric: He has a normal mood and affect  His behavior is normal  Judgment and thought content normal    Vitals reviewed  Portions of the record may have been created with voice recognition software  Occasional wrong word or "sound a like" substitutions may have occurred due to the inherent limitations of voice recognition software  Read the chart carefully and recognize, using context, where substitutions have occurred

## 2018-07-24 NOTE — PATIENT INSTRUCTIONS
Casey Navarro: Keep bedtime dose at 20 units, for now  Novolog:  Increase before breakfast to 8 units, lunch to 10 units and dinner to 12 units  Please send in blood sugars in 1 week for reassessment  Be mindful of diet  Continue Levothyroxine at current dose  Obtain a thyroid nodule biopsy of the #2 left thyroid nodule as discussed  Obtain updated thyroid lab work  Continue to utilize CPAP nightly

## 2018-07-25 ENCOUNTER — TELEPHONE (OUTPATIENT)
Dept: ENDOCRINOLOGY | Facility: HOSPITAL | Age: 83
End: 2018-07-25

## 2018-07-25 DIAGNOSIS — Z79.4 TYPE 2 DIABETES MELLITUS WITH COMPLICATION, WITH LONG-TERM CURRENT USE OF INSULIN (HCC): ICD-10-CM

## 2018-07-25 DIAGNOSIS — E11.8 TYPE 2 DIABETES MELLITUS WITH COMPLICATION, WITH LONG-TERM CURRENT USE OF INSULIN (HCC): ICD-10-CM

## 2018-07-31 ENCOUNTER — TELEPHONE (OUTPATIENT)
Dept: ENDOCRINOLOGY | Facility: HOSPITAL | Age: 83
End: 2018-07-31

## 2018-07-31 LAB — HBA1C MFR BLD HPLC: 6.5 %

## 2018-08-03 ENCOUNTER — TELEPHONE (OUTPATIENT)
Dept: ENDOCRINOLOGY | Facility: CLINIC | Age: 83
End: 2018-08-03

## 2018-08-03 NOTE — PROGRESS NOTES
Please call the patient regarding abnormal result  Fasting glucose is elevated at 118 on CMP, BUN and creatinine continue to be elevated  Can we confirm that he is being seen by Nephrology? TSH is normal with a low free T3 and free T4  Please increase levothyroxine to 150 mcg daily and recheck TSH and free T4 in 6 weeks to re-evaluate

## 2018-08-06 DIAGNOSIS — E03.9 HYPOTHYROIDISM, UNSPECIFIED TYPE: Primary | ICD-10-CM

## 2018-08-06 RX ORDER — LEVOTHYROXINE SODIUM 0.15 MG/1
150 TABLET ORAL DAILY
Qty: 30 TABLET | Refills: 1 | Status: SHIPPED | OUTPATIENT
Start: 2018-08-06 | End: 2018-08-08 | Stop reason: DRUGHIGH

## 2018-08-06 NOTE — TELEPHONE ENCOUNTER
Paulino Hand advised pt to change his Levothyroxine to 150 daily, but didn't send a script to the pharmacy  Please send Rx

## 2018-08-07 ENCOUNTER — TELEPHONE (OUTPATIENT)
Dept: ENDOCRINOLOGY | Facility: HOSPITAL | Age: 83
End: 2018-08-07

## 2018-08-07 DIAGNOSIS — E03.9 HYPOTHYROIDISM, UNSPECIFIED TYPE: Primary | ICD-10-CM

## 2018-08-07 RX ORDER — LEVOTHYROXINE SODIUM 0.07 MG/1
75 TABLET ORAL DAILY
Qty: 30 TABLET | Refills: 3 | Status: SHIPPED | OUTPATIENT
Start: 2018-08-07 | End: 2018-08-08 | Stop reason: DRUGHIGH

## 2018-08-07 NOTE — TELEPHONE ENCOUNTER
Review overall average blood sugars yields only 2 days worth of numbers but they appear to be better controlled  Please continue to check blood sugars and send in for review

## 2018-08-07 NOTE — PROGRESS NOTES
No, if that's the case    Have him increase to a full 75 mcg tablet every day and recheck TSH and Free T4 in 6 weeks

## 2018-08-08 DIAGNOSIS — E03.9 HYPOTHYROIDISM, UNSPECIFIED TYPE: ICD-10-CM

## 2018-08-08 RX ORDER — LEVOTHYROXINE SODIUM 0.07 MG/1
75 TABLET ORAL DAILY
Qty: 30 TABLET | Refills: 0
Start: 2018-08-08